# Patient Record
Sex: FEMALE | Race: WHITE
[De-identification: names, ages, dates, MRNs, and addresses within clinical notes are randomized per-mention and may not be internally consistent; named-entity substitution may affect disease eponyms.]

---

## 2024-09-12 ENCOUNTER — NON-APPOINTMENT (OUTPATIENT)
Age: 49
End: 2024-09-12

## 2024-09-13 ENCOUNTER — APPOINTMENT (OUTPATIENT)
Dept: NEUROSURGERY | Facility: CLINIC | Age: 49
End: 2024-09-13

## 2024-09-13 VITALS
HEIGHT: 66 IN | DIASTOLIC BLOOD PRESSURE: 80 MMHG | SYSTOLIC BLOOD PRESSURE: 125 MMHG | WEIGHT: 176 LBS | BODY MASS INDEX: 28.28 KG/M2

## 2024-09-13 DIAGNOSIS — Z86.59 PERSONAL HISTORY OF OTHER MENTAL AND BEHAVIORAL DISORDERS: ICD-10-CM

## 2024-09-13 DIAGNOSIS — G43.909 MIGRAINE, UNSPECIFIED, NOT INTRACTABLE, W/OUT STATUS MIGRAINOSUS: ICD-10-CM

## 2024-09-13 DIAGNOSIS — Z86.79 PERSONAL HISTORY OF OTHER DISEASES OF THE CIRCULATORY SYSTEM: ICD-10-CM

## 2024-09-13 DIAGNOSIS — I67.1 CEREBRAL ANEURYSM, NONRUPTURED: ICD-10-CM

## 2024-09-13 DIAGNOSIS — Z80.3 FAMILY HISTORY OF MALIGNANT NEOPLASM OF BREAST: ICD-10-CM

## 2024-09-13 PROBLEM — Z00.00 ENCOUNTER FOR PREVENTIVE HEALTH EXAMINATION: Status: ACTIVE | Noted: 2024-09-13

## 2024-09-13 PROCEDURE — 99204 OFFICE O/P NEW MOD 45 MIN: CPT

## 2024-09-13 RX ORDER — OMEPRAZOLE 40 MG/1
40 CAPSULE, DELAYED RELEASE ORAL
Refills: 0 | Status: ACTIVE | COMMUNITY

## 2024-09-13 RX ORDER — NORETHINDRONE ACETATE/ETHINYL ESTRADIOL 1MG-20MCG
KIT ORAL
Refills: 0 | Status: ACTIVE | COMMUNITY

## 2024-09-13 RX ORDER — METOPROLOL SUCCINATE 25 MG/1
25 TABLET, EXTENDED RELEASE ORAL
Refills: 0 | Status: ACTIVE | COMMUNITY

## 2024-09-13 RX ORDER — ALPRAZOLAM 0.25 MG/1
0.25 TABLET ORAL
Refills: 0 | Status: ACTIVE | COMMUNITY

## 2024-09-13 NOTE — HISTORY OF PRESENT ILLNESS
[de-identified] : 48-year-old right-handed female presents the neurosurgery office today to establish care, referred from ENT specialty.  Patient endorses that since July, worsening recently, she has been experiencing a whooshing sound to her left ear. It is sometimes associated with a whistling sound.  Both sounds are constant. She has a history of sinusitis and sinus issues and so presented to her primary care physician who placed her on a short course of steroids and antibiotics however the sounds persisted. She then went to an ENT specialist for evaluation and was referred for a CTA.  CTA performed on 8/21/2024 at [facility] incidentally identified irregularity and beading of the internal carotid and to a lesser degree the proximal right vertebral arteries and minimal involvement of the proximal left vertebral artery. 1 to 2 mm outpouchings, likely aneurysms, also noted to the distal right internal carotid artery.  No known family history of cerebral aneurysms or sudden death. No history of smoking cigarettes. Patient is on oral antihypertensive agents, BP today 125/80.  Patient was educated today that the aneurysm is a second concern. Positive left bruit auscultated behind her ear today during the visit. Evidence of sinus turbulence. The benefits of undergoing a diagnostic cerebral angiogram to rule out both a fistula and to gain more information for the cerebral aneurysms was discussed with Ms. VALDOVINOS today. She agreed to proceed and will be scheduled accordingly.

## 2024-09-13 NOTE — PHYSICAL EXAM
[General Appearance - Alert] : alert [General Appearance - In No Acute Distress] : in no acute distress [Oriented To Time, Place, And Person] : oriented to person, place, and time [Motor Tone] : muscle tone was normal in all four extremities [Abnormal Walk] : normal gait

## 2024-09-13 NOTE — ASSESSMENT
[FreeTextEntry1] : 48-year-old female presents as a new patient referred from ENT for an incidental finding of aneurysms along the distal right ICA during a workup for left-sided tinnitus.  Patient agreed to undergo a diagnostic cerebral angiogram to rule out both a fistula and to gain more information regarding the aneurysms.  She will be scheduled accordingly.  All questions were answered today.  Karen Ruby MS, FNP-BC Narayan El MD, [facility]

## 2024-09-14 ENCOUNTER — TRANSCRIPTION ENCOUNTER (OUTPATIENT)
Age: 49
End: 2024-09-14

## 2024-09-24 ENCOUNTER — OUTPATIENT (OUTPATIENT)
Dept: OUTPATIENT SERVICES | Facility: HOSPITAL | Age: 49
LOS: 1 days | Discharge: HOME | End: 2024-09-24
Payer: COMMERCIAL

## 2024-09-24 ENCOUNTER — TRANSCRIPTION ENCOUNTER (OUTPATIENT)
Age: 49
End: 2024-09-24

## 2024-09-24 ENCOUNTER — RESULT REVIEW (OUTPATIENT)
Age: 49
End: 2024-09-24

## 2024-09-24 VITALS
HEART RATE: 80 BPM | OXYGEN SATURATION: 99 % | SYSTOLIC BLOOD PRESSURE: 150 MMHG | RESPIRATION RATE: 18 BRPM | DIASTOLIC BLOOD PRESSURE: 66 MMHG

## 2024-09-24 VITALS
RESPIRATION RATE: 16 BRPM | SYSTOLIC BLOOD PRESSURE: 155 MMHG | HEIGHT: 60.6 IN | WEIGHT: 175.93 LBS | TEMPERATURE: 98 F | OXYGEN SATURATION: 98 % | DIASTOLIC BLOOD PRESSURE: 71 MMHG | HEART RATE: 80 BPM

## 2024-09-24 DIAGNOSIS — Z98.890 OTHER SPECIFIED POSTPROCEDURAL STATES: Chronic | ICD-10-CM

## 2024-09-24 DIAGNOSIS — I67.1 CEREBRAL ANEURYSM, NONRUPTURED: ICD-10-CM

## 2024-09-24 PROBLEM — Z87.898 PERSONAL HISTORY OF OTHER SPECIFIED CONDITIONS: Chronic | Status: ACTIVE | Noted: 2024-09-18

## 2024-09-24 PROBLEM — G43.909 MIGRAINE, UNSPECIFIED, NOT INTRACTABLE, WITHOUT STATUS MIGRAINOSUS: Chronic | Status: ACTIVE | Noted: 2024-09-18

## 2024-09-24 PROBLEM — F41.9 ANXIETY DISORDER, UNSPECIFIED: Chronic | Status: ACTIVE | Noted: 2024-09-18

## 2024-09-24 PROBLEM — I10 ESSENTIAL (PRIMARY) HYPERTENSION: Chronic | Status: ACTIVE | Noted: 2024-09-18

## 2024-09-24 PROBLEM — K21.9 GASTRO-ESOPHAGEAL REFLUX DISEASE WITHOUT ESOPHAGITIS: Chronic | Status: ACTIVE | Noted: 2024-09-18

## 2024-09-24 PROBLEM — H35.011 CHANGES IN RETINAL VASCULAR APPEARANCE, RIGHT EYE: Chronic | Status: ACTIVE | Noted: 2024-09-18

## 2024-09-24 PROCEDURE — 36226 PLACE CATH VERTEBRAL ART: CPT | Mod: 50

## 2024-09-24 PROCEDURE — 36227 PLACE CATH XTRNL CAROTID: CPT | Mod: 59,LT

## 2024-09-24 PROCEDURE — 76937 US GUIDE VASCULAR ACCESS: CPT

## 2024-09-24 PROCEDURE — 76377 3D RENDER W/INTRP POSTPROCES: CPT | Mod: 26

## 2024-09-24 PROCEDURE — C1887: CPT

## 2024-09-24 PROCEDURE — 36227 PLACE CATH XTRNL CAROTID: CPT | Mod: 50

## 2024-09-24 PROCEDURE — C1894: CPT

## 2024-09-24 PROCEDURE — 76937 US GUIDE VASCULAR ACCESS: CPT | Mod: 26

## 2024-09-24 PROCEDURE — 36224 PLACE CATH CAROTD ART: CPT | Mod: 50

## 2024-09-24 RX ORDER — SODIUM CHLORIDE 9 MG/ML
1000 INJECTION INTRAMUSCULAR; INTRAVENOUS; SUBCUTANEOUS
Refills: 0 | Status: DISCONTINUED | OUTPATIENT
Start: 2024-09-24 | End: 2024-09-24

## 2024-09-24 NOTE — ASU DISCHARGE PLAN (ADULT/PEDIATRIC) - CARE PROVIDER_API CALL
Narayan El  Neurosurgery  [address] [place] [address], [address]  [place] [address]  Phone: [phone]  Fax: [phone]  Follow Up Time:

## 2024-09-24 NOTE — BRIEF OPERATIVE NOTE - OPERATION/FINDINGS
Procedure: Diagnostic Cerebral Angiogram   Contrast: Visipaque 320   IA medications: Heparin 3000 IU, Verapamil 2.5mg, Nitroglycerin 200 mcg  Implants placed: None  Complications: None    Preliminary Report:  A selective diagnostic cerebral angiogram was performed. 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 It was explained to the patient that this is a possible source of the auditory changes experienced recently. Vascular changes consistent with a possible fibromuscular dysplasia were also identified. A right radial arteriotomy site was used for procedural access, and a TR band was applied post procedure.     Please continue to monitor the arteriotomy site for signs of hematoma/ bleeding/ infection or for diminished or absent distal pulses or temperature/ color changes. Notify a provider if any of the above is noticed or with any additional questions/ concerns.   NIHSS pre procedure: 0  NIHSS post procedure: 0  Extremity: RUE remains c/d/i with no sign of bleeding or hematoma formation; nontender; temperature and color consistent between b/l UE. Radial pulse intact to palpation.    Official IR neuro procedure note is to follow.

## 2024-09-24 NOTE — BRIEF OPERATIVE NOTE - COMMENTS
- Patient should follow up this Friday for discussion regarding conservative monitoring vs. endovascular management of the AVF (it was explained that the risks of hemorrhage at this time is likely low, but management may aid in the resolution of auditory symptoms)   - SBP <140

## 2024-09-24 NOTE — ASU PATIENT PROFILE, ADULT - FALL HARM RISK - UNIVERSAL INTERVENTIONS
Bed in lowest position, wheels locked, appropriate side rails in place/Call bell, personal items and telephone in reach/Instruct patient to call for assistance before getting out of bed or chair/Non-slip footwear when patient is out of bed/[place] to call system/Physically safe environment - no spills, clutter or unnecessary equipment/Purposeful Proactive Rounding/Room/bathroom lighting operational, light cord in reach

## 2024-09-24 NOTE — CHART NOTE - NSCHARTNOTEFT_GEN_A_CORE
PACU ANESTHESIA ADMISSION NOTE      Procedure: diagnostic cerebral angiogram  Post op diagnosis:  cerebral aneurysm    __x__  Patent Airway    __x__  Full return of protective reflexes    __x__  Full recovery from anesthesia / back to baseline status    Vitals:  T(C): 36.5 (09-24-24 @ 14:00), Max: 36.5 (09-24-24 @ 11:02)  HR: 77 (09-24-24 @ 14:00) (77 - 80)  BP: 141/67 (09-24-24 @ 14:00) (141/67 - 155/71)  RR: 18 (09-24-24 @ 14:00) (16 - 18)  SpO2: 100% (09-24-24 @ 14:00) (98% - 100%)    Mental Status:  __x__ Awake   ___x__ Alert   _____ Drowsy   _____ Sedated    Nausea/Vomiting:  __x__ NO  ______Yes,   See Post - Op Orders          Pain Scale (0-10):  __0___    Treatment: ____ None    __x__ See Post - Op/PCA Orders    Post - Operative Fluids:   ____ Oral   __x__ See Post - Op Orders    Plan: Discharge:   __x__Home       _____Floor     _____Critical Care    _____  Other:_________________    Comments: Patient had smooth intraoperative event, no anesthesia complication.  PACU Vital signs: HR: 80           BP:        141/67          RR: 16            O2 Sat:       98%

## 2024-09-24 NOTE — PRE PROCEDURE NOTE - PRE PROCEDURE EVALUATION
Interventional Neuro Radiology  Pre-Procedure Note PA-C    HPI:  The patient is a 48-year-old, right-handed female with a past history of HTN and no known family history of intracranial aneurysms who presented as a referral from an ENT clinic for a whooshing sound in her left ear, which she reports has been worsening since July of this year. Sometimes it is associated with a whistling sound, and the sounds have been constant. CTA h/n performed at [facility] 8/21/2024 showed irregularity and beading of the internal carotid artery and the proximal right vertebral arteries with minimal involvement of the proximal left vertebral artery. 1-2mm aneurysmal dilatation  of the distal right ICA were also reported. A possible left ear bruit was heard.  The patient presents today for a diagnostic cerebral angiogram to obtain better imaging of the intracranial vasculature given the above findings. NIHSS 0 with no acute complaints. NPO except medication after midnight last night.     Allergies: No Known Allergies    PAST MEDICAL & SURGICAL HISTORY:  Palpitation  Anxiety  GERD (gastroesophageal reflux disease)  HTN (hypertension)  Migraine headache  History of nasal congestion  Changes in retinal vascular appearance of right eye  S/P hernia surgery    FAMILY HISTORY:  FH: breast cancer (Mother)  FH: bladder cancer (Mother)  FH: skin cancer (Mother)  Family history of mitral valve repair (Sibling)    Assessment/Plan:   This is a 48-year-old female who presents for a diagnostic cerebral angiogram to assess vessel irregularities identified on outpatient CTA head/ neck.   Procedure, goals, risks, benefits and alternatives  were discussed with patient.  All questions were answered to best understanding.   Risks discussed include but are not limited to stroke, vessel injury, hemorrhage, and/or hematoma.  Patient demonstrates understanding of all risks involved with this procedure and wishes to continue.     Appropriate consent was obtained from patient and consent is in the patient's chart.

## 2024-09-27 ENCOUNTER — APPOINTMENT (OUTPATIENT)
Dept: NEUROSURGERY | Facility: CLINIC | Age: 49
End: 2024-09-27

## 2024-09-27 VITALS
WEIGHT: 176 LBS | DIASTOLIC BLOOD PRESSURE: 71 MMHG | SYSTOLIC BLOOD PRESSURE: 118 MMHG | HEIGHT: 66 IN | BODY MASS INDEX: 28.28 KG/M2

## 2024-09-27 DIAGNOSIS — I77.0 ARTERIOVENOUS FISTULA, ACQUIRED: ICD-10-CM

## 2024-09-27 PROBLEM — R00.2 PALPITATIONS: Chronic | Status: ACTIVE | Noted: 2024-09-18

## 2024-09-27 PROCEDURE — 99213 OFFICE O/P EST LOW 20 MIN: CPT

## 2024-09-27 NOTE — END OF VISIT
[FreeTextEntry3] :  I, Dr El personally performed the evaluation and management (E/M) services for this established patient who presents today with (a) new problem(s)/exacerbation of (an) existing condition(s).  That E/M includes conducting the examination, assessing all new/exacerbated conditions, and establishing a new plan of care.  Today, my JENNIFER was here to observe my evaluation and management services for this new problem/exacerbated condition to be followed going forward.  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  I do not see any obvious cortical venous reflux on the imaging.  These almost certainly explain her symptoms of pulsatile tinnitus and may well explain the congested feeling within her sinuses and nasal area on the ipsilateral side related to venous drainage through this occluded sinus.  I explained that her brain does not appear to be draining from the left-sided sigmoid sinus or left-sided region and that the options for treatment include observation, transvenous and/or transarterial embolization.  She also complains of palpitations and the feeling of a hyperdynamic type circulation which could be related to this fistula.  After considering all her options she wishes to proceed with endovascular management for the fistula and we will schedule this in due course.  We went over in detail the natural history, options as well as risks and benefits of therapy and I believe that she and her  were fully informed of these.

## 2024-10-01 ENCOUNTER — TRANSCRIPTION ENCOUNTER (OUTPATIENT)
Age: 49
End: 2024-10-01

## 2024-10-07 ENCOUNTER — RESULT REVIEW (OUTPATIENT)
Age: 49
End: 2024-10-07

## 2024-10-07 ENCOUNTER — OUTPATIENT (OUTPATIENT)
Dept: OUTPATIENT SERVICES | Facility: HOSPITAL | Age: 49
LOS: 1 days | End: 2024-10-07
Payer: COMMERCIAL

## 2024-10-07 DIAGNOSIS — R22.9 LOCALIZED SWELLING, MASS AND LUMP, UNSPECIFIED: ICD-10-CM

## 2024-10-07 DIAGNOSIS — Z00.8 ENCOUNTER FOR OTHER GENERAL EXAMINATION: ICD-10-CM

## 2024-10-07 DIAGNOSIS — Z98.890 OTHER SPECIFIED POSTPROCEDURAL STATES: Chronic | ICD-10-CM

## 2024-10-07 PROCEDURE — 93970 EXTREMITY STUDY: CPT

## 2024-10-07 PROCEDURE — 93970 EXTREMITY STUDY: CPT | Mod: 26

## 2024-10-08 ENCOUNTER — TRANSCRIPTION ENCOUNTER (OUTPATIENT)
Age: 49
End: 2024-10-08

## 2024-10-08 ENCOUNTER — NON-APPOINTMENT (OUTPATIENT)
Age: 49
End: 2024-10-08

## 2024-10-08 DIAGNOSIS — R22.9 LOCALIZED SWELLING, MASS AND LUMP, UNSPECIFIED: ICD-10-CM

## 2024-10-22 ENCOUNTER — OUTPATIENT (OUTPATIENT)
Dept: OUTPATIENT SERVICES | Facility: HOSPITAL | Age: 49
LOS: 1 days | End: 2024-10-22
Payer: COMMERCIAL

## 2024-10-22 VITALS
HEIGHT: 66 IN | RESPIRATION RATE: 16 BRPM | DIASTOLIC BLOOD PRESSURE: 86 MMHG | TEMPERATURE: 98 F | WEIGHT: 175.93 LBS | OXYGEN SATURATION: 96 % | SYSTOLIC BLOOD PRESSURE: 148 MMHG | HEART RATE: 81 BPM

## 2024-10-22 DIAGNOSIS — Z98.890 OTHER SPECIFIED POSTPROCEDURAL STATES: Chronic | ICD-10-CM

## 2024-10-22 DIAGNOSIS — I77.0 ARTERIOVENOUS FISTULA, ACQUIRED: ICD-10-CM

## 2024-10-22 DIAGNOSIS — Z01.818 ENCOUNTER FOR OTHER PREPROCEDURAL EXAMINATION: ICD-10-CM

## 2024-10-22 PROCEDURE — 99214 OFFICE O/P EST MOD 30 MIN: CPT | Mod: 25

## 2024-10-22 NOTE — H&P PST ADULT - HISTORY OF PRESENT ILLNESS
48 y/o female presents to [facility] in preparation for arteriovenous malformation and arteriovenous  fisula embolization in IR with Dr. El     Pt reports that she had a wishing sound in her left ear that prompted further workup and pt had a cerebral angiogram. Following results of cerebral angiogram pt now for above procedure.   As per surgeon note:  Conference: audible bruit around mastoid area can correlate to an arterial fistula, with jugular compression, the  pulsatile tinnitus did not improve as usual; symptoms are causing uncomfortable palpitations. There is fast venous  filling 2/2 AVF - majority filling from the MMA - could be a risk to embolize with the Wilman into the MMA. An option  could be to come transvenous and coil the fistula off and finish the treatment with [place] angiogram which disclosed what appears to be a [place] type I dural  arteriovenous fistula of the left sided sigmoid sinus. I do not see any obvious cortical venous reflux on the imaging.  These almost certainly explain her symptoms of pulsatile tinnitus and may well explain the congested feeling within her sinuses and nasal area on the ipsilateral side related to venous drainage through this occluded sinus. I explained that her brain does not appear to be draining from the left-sided sigmoid sinus or left-sided region and   that the options for treatment include observation, transvenous and/or transarterial embolization. She also complains of palpitations and the feeling of a hyperdynamic type circulation which could be related to this fistula.    PATIENT/GUARDIAN CURRENTLY DENIES CHEST PAIN  SHORTNESS OF BREATH  PALPITATIONS,  DYSURIA, OR UPPER RESPIRATORY INFECTION IN PAST 2 WEEKS  Patient/Guardian understands instructions and was given the opportunity to ask questions and have them answered.  As per patient/guardian, this is their complete medical and surgical history, including medications both prescribed or over the counter.  written and verbal instructions with teach back on chlorhexidine shampoo provided,  pt verbalized understanding with returned demonstration    Anesthesia Alert  NO--Difficult Airway  NO--History of neck surgery or radiation  NO--Limited ROM of neck  NO--History of Malignant hyperthermia  NO--Personal or family history of Pseudocholinesterase deficiency.  NO--Prior Anesthesia Complication  NO--Latex Allergy  NO--Loose teeth  NO--History of Rheumatoid Arthritis  NO--EDUARDO  NO--Bleeding risk  NO--Other_____  Mallampati airway: Class II    [facility] Activity Status Index (DASI)     RESULT SUMMARY:  58.2 points  The higher the score (maximum 58.2), the higher the functional status.    9.89 METs        INPUTS:  Take care of self —> 2.75 = Yes  Walk indoors —> 1.75 = Yes  Walk 1&ndash;2 blocks on level ground —> 2.75 = Yes  Climb a flight of stairs or walk up a hill —> 5.5 = Yes  Run a short distance —> 8 = Yes  Do light work around the house —> 2.7 = Yes  Do moderate work around the house —> 3.5 = Yes  Do heavy work around the house —> 8 = Yes  Do yardwork —> 4.5 = Yes  Have sexual relations —> 5.25 = Yes  Participate in moderate recreational activities —> 6 = Yes  Participate in strenuous sports —> 7.5 = Yes    Revised Cardiac Risk Index for Pre-Operative Risk    RESULT SUMMARY:  0 points  Class I Risk    3.9 %  30-day risk of death, MI, or cardiac arrest    INPUTS:  Elevated-risk surgery —> 0 = No  History of ischemic heart disease —> 0 = No  History of congestive heart failure —> 0 = No  History of cerebrovascular disease —> 0 = No  Pre-operative treatment with insulin —> 0 = No  Pre-operative creatinine >2 mg/dL / 176.8 µmol/L —> 0 = No      Acquired arteriovenous fistula    Encounter for other preprocedural examination    FH: breast cancer (Mother)    FH: bladder cancer (Mother)    FH: skin cancer (Mother)    Family history of mitral valve repair (Sibling)    Palpitation    Anxiety    GERD (gastroesophageal reflux disease)    HTN (hypertension)    Migraine headache    History of nasal congestion    Changes in retinal vascular appearance of right eye    S/P hernia surgery    [id number]    [id number]

## 2024-10-22 NOTE — H&P PST ADULT - REASON FOR ADMISSION
48 y/o female presents to [facility] in preparation for arteriovenous malformation and arteriovenous  fisula embolization in IR with Dr. El

## 2024-10-22 NOTE — H&P PST ADULT - NSICDXFAMILYHX_GEN_ALL_CORE_FT
FAMILY HISTORY:  Mother  Still living? Yes, Estimated age: Age Unknown  FH: bladder cancer, Age at diagnosis: Age Unknown  FH: breast cancer, Age at diagnosis: Age Unknown  FH: skin cancer, Age at diagnosis: Age Unknown    Sibling  Still living? Yes, Estimated age: Age Unknown  Family history of mitral valve repair, Age at diagnosis: Age Unknown

## 2024-10-22 NOTE — H&P PST ADULT - NSICDXPASTMEDICALHX_GEN_ALL_CORE_FT
PAST MEDICAL HISTORY:  Anxiety     Changes in retinal vascular appearance of right eye     GERD (gastroesophageal reflux disease)     History of nasal congestion     HTN (hypertension)     Migraine headache     Palpitation

## 2024-10-23 DIAGNOSIS — I77.0 ARTERIOVENOUS FISTULA, ACQUIRED: ICD-10-CM

## 2024-10-23 DIAGNOSIS — Z01.818 ENCOUNTER FOR OTHER PREPROCEDURAL EXAMINATION: ICD-10-CM

## 2024-10-24 ENCOUNTER — TRANSCRIPTION ENCOUNTER (OUTPATIENT)
Age: 49
End: 2024-10-24

## 2024-11-05 ENCOUNTER — INPATIENT (INPATIENT)
Facility: HOSPITAL | Age: 49
LOS: 1 days | Discharge: HOME | DRG: 27 | End: 2024-11-07
Attending: NEUROLOGICAL SURGERY | Admitting: NEUROLOGICAL SURGERY
Payer: COMMERCIAL

## 2024-11-05 ENCOUNTER — APPOINTMENT (OUTPATIENT)
Dept: NEUROSURGERY | Facility: HOSPITAL | Age: 49
End: 2024-11-05

## 2024-11-05 VITALS
DIASTOLIC BLOOD PRESSURE: 79 MMHG | WEIGHT: 175.93 LBS | HEART RATE: 89 BPM | RESPIRATION RATE: 18 BRPM | TEMPERATURE: 97 F | HEIGHT: 66 IN | SYSTOLIC BLOOD PRESSURE: 147 MMHG

## 2024-11-05 DIAGNOSIS — Z98.890 OTHER SPECIFIED POSTPROCEDURAL STATES: Chronic | ICD-10-CM

## 2024-11-05 DIAGNOSIS — I77.0 ARTERIOVENOUS FISTULA, ACQUIRED: ICD-10-CM

## 2024-11-05 PROCEDURE — 61624 TCAT PERM OCCLS/EMBOLJ CNS: CPT

## 2024-11-05 PROCEDURE — 36226 PLACE CATH VERTEBRAL ART: CPT | Mod: 50

## 2024-11-05 PROCEDURE — 97162 PT EVAL MOD COMPLEX 30 MIN: CPT | Mod: GP

## 2024-11-05 PROCEDURE — 76937 US GUIDE VASCULAR ACCESS: CPT | Mod: 26

## 2024-11-05 PROCEDURE — 75894 X-RAYS TRANSCATH THERAPY: CPT | Mod: 26

## 2024-11-05 PROCEDURE — 85025 COMPLETE CBC W/AUTO DIFF WBC: CPT

## 2024-11-05 PROCEDURE — 36415 COLL VENOUS BLD VENIPUNCTURE: CPT

## 2024-11-05 PROCEDURE — 99222 1ST HOSP IP/OBS MODERATE 55: CPT | Mod: GC

## 2024-11-05 PROCEDURE — 97166 OT EVAL MOD COMPLEX 45 MIN: CPT | Mod: GO

## 2024-11-05 PROCEDURE — 36223 PLACE CATH CAROTID/INOM ART: CPT | Mod: 50

## 2024-11-05 PROCEDURE — 83735 ASSAY OF MAGNESIUM: CPT

## 2024-11-05 PROCEDURE — 80053 COMPREHEN METABOLIC PANEL: CPT

## 2024-11-05 PROCEDURE — 36227 PLACE CATH XTRNL CAROTID: CPT | Mod: LT

## 2024-11-05 PROCEDURE — 84100 ASSAY OF PHOSPHORUS: CPT

## 2024-11-05 PROCEDURE — 75898 FOLLOW-UP ANGIOGRAPHY: CPT | Mod: 26

## 2024-11-05 RX ORDER — ALPRAZOLAM 0.25 MG
0.25 TABLET ORAL DAILY
Refills: 0 | Status: DISCONTINUED | OUTPATIENT
Start: 2024-11-05 | End: 2024-11-07

## 2024-11-05 RX ORDER — METOPROLOL TARTRATE 50 MG
25 TABLET ORAL DAILY
Refills: 0 | Status: DISCONTINUED | OUTPATIENT
Start: 2024-11-06 | End: 2024-11-06

## 2024-11-05 RX ORDER — SODIUM CHLORIDE 9 MG/ML
1000 INJECTION, SOLUTION INTRAMUSCULAR; INTRAVENOUS; SUBCUTANEOUS
Refills: 0 | Status: DISCONTINUED | OUTPATIENT
Start: 2024-11-05 | End: 2024-11-06

## 2024-11-05 RX ORDER — PANTOPRAZOLE SODIUM 40 MG/1
40 TABLET, DELAYED RELEASE ORAL
Refills: 0 | Status: DISCONTINUED | OUTPATIENT
Start: 2024-11-05 | End: 2024-11-07

## 2024-11-05 RX ORDER — ONDANSETRON HYDROCHLORIDE 2 MG/ML
4 INJECTION, SOLUTION INTRAMUSCULAR; INTRAVENOUS EVERY 6 HOURS
Refills: 0 | Status: DISCONTINUED | OUTPATIENT
Start: 2024-11-05 | End: 2024-11-07

## 2024-11-05 RX ORDER — ACETAMINOPHEN 500 MG
650 TABLET ORAL EVERY 6 HOURS
Refills: 0 | Status: DISCONTINUED | OUTPATIENT
Start: 2024-11-05 | End: 2024-11-05

## 2024-11-05 RX ORDER — ACETAMINOPHEN 500 MG
325 TABLET ORAL EVERY 4 HOURS
Refills: 0 | Status: DISCONTINUED | OUTPATIENT
Start: 2024-11-05 | End: 2024-11-06

## 2024-11-05 RX ADMIN — Medication 325 MILLIGRAM(S): at 20:49

## 2024-11-05 RX ADMIN — SODIUM CHLORIDE 75 MILLILITER(S): 9 INJECTION, SOLUTION INTRAMUSCULAR; INTRAVENOUS; SUBCUTANEOUS at 15:14

## 2024-11-05 RX ADMIN — Medication 650 MILLIGRAM(S): at 12:17

## 2024-11-05 RX ADMIN — Medication 325 MILLIGRAM(S): at 15:45

## 2024-11-05 NOTE — CONSULT NOTE ADULT - NS ATTEND AMEND GEN_ALL_CORE FT
50 yo f with hx htn anxiety, gerd s/p AVF embo, admitted to nsicu for monitoring    post-NI protocol neuro assessments  sbp 110-140  c/w home bblocker  analgesia prn  neuroIR followup    dispo nsicu

## 2024-11-05 NOTE — CONSULT NOTE ADULT - ASSESSMENT
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      Neurological:  - Neuro Checks q1  - Neurovascular check Q1 of the R. radial artery for hematoma  - Analgesia: Tylenol/tramadol 25mg prn  - NeuroIR following  - PT/OT  - Activity: bedrest    Cardiovascular:  - SBP goal: <140  - EKG - NSR  - Echo    Pulmonary:  - HOB 45  - Maintain SaO2 > 92%  - incentive spirometry    GI:  - NPO,   - Dysphagia screening  - GI ppx- not indicated at this time  - Bowel Regimen: Senna     :  - 1L NS @50 cc/hr  - Strict Is/Os  - Keep normonatremic  - Maintain Mg > 2, K >4, Phos >3  - Voiding freely    Endo:  - Keep euglycemia (Glu 140-180)    Hematology:  - SCDs    ID:  - Afebrile  - Monitor for fever      Code Status: Full   48 year old right handed female with a past history of HTN , anxiety present s/p elective AVF embolization.    Neurological:  - Neuro Checks q1  - Analgesia: Tylenol/tramadol 25mg prn  - C/w with home alprazolam 0.25 mg  - NeuroIR following  - PT/OT  - Activity: bedrest    Cardiovascular:  - SBP goal: 110-140  - EKG - NSR  - Echo    Pulmonary:  - HOB 45  - Maintain SaO2 > 92%  - incentive spirometry    GI:  - Diet  - GI ppx- not indicated at this time  - Bowel Regimen: Senna     :  - 1L NS @50 cc/hr  - Keep normonatremic  - Maintain Mg > 2, K >4, Phos >3  - Voiding freely    Endo:  - Keep euglycemia (Glu 140-180)    Hematology:  - SCDs    ID:  - Afebrile  - Monitor for fever      Code Status: Full   48 year old right handed female with a past history of HTN , anxiety present s/p elective AVF embolization.    Neurological:  - Neuro Checks q1  - Analgesia: Tylenol/tramadol 25mg prn  - C/w with home alprazolam 0.25 mg  - NeuroIR following  - PT/OT  - Activity: bedrest    Cardiovascular:  - SBP goal: 110-140  - C/w metoprolol 25mg    Pulmonary:  - HOB 45  - Maintain SaO2 > 92%  - Incentive spirometry    GI:  - Diet  - On home pantoprazole 40 mg daily  - Bowel Regimen: Senna     :  - 1L NS @75 cc/hr  - Keep normonatremic  - Maintain Mg > 2, K >4, Phos >3  - Voiding freely    Endo:  - Keep euglycemia (Glu 140-180)    Hematology:  - SCDs    ID:  - Afebrile  - Monitor for fever      Code Status: Full

## 2024-11-05 NOTE — H&P ADULT - HISTORY OF PRESENT ILLNESS
Patient is a 48 year old right handed female with a past history of HTN and no known family history of intracranial aneurysm who presented from ENT for complaints of whooshing sounds in her left ear, worsened since July. 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 Patient now presents today for elective AVF embolization with the neuroendovascular team. No complaints, NIHSS0.

## 2024-11-05 NOTE — BRIEF OPERATIVE NOTE - OPERATION/FINDINGS
Procedure : Diagnostic cerebral angiogram + AVF embolization     Contrast: Visipaque 320   IA medications: Heparin 3000 IU, Verapamil 2.5mg, Nitroglycerin 200 mcg   Implants placed: n/a  Complications : n/a    Preliminary Report:  Selective diagnostic angiogram performed from right radial arteriotomy site with preliminary findings of distal left MMA embolization for successful AVF embolization with Wilman and DMSO.     Official IR neuro procedure note to follow.

## 2024-11-05 NOTE — H&P ADULT - NSHPPHYSICALEXAM_GEN_ALL_CORE
T(C): 36.2 (11-05-24 @ 08:33), Max: 36.2 (11-05-24 @ 08:33)  HR: 89 (11-05-24 @ 08:33) (89 - 89)  BP: 147/79 (11-05-24 @ 08:33) (147/79 - 147/79)  RR: 18 (11-05-24 @ 08:33) (18 - 18)  SpO2: --    CONSTITUTIONAL: Well groomed, no apparent distress  EYES: PERRLA and symmetric, EOMI  RESP: No respiratory distress, no use of accessory muscles  CV: RRR  GI: Soft, NT, ND  NIHSS 0

## 2024-11-05 NOTE — H&P ADULT - ASSESSMENT
Patient is a 48 year old right handed female with a past history of HTN and no known family history of intracranial aneurysm who presented from ENT for complaints of whooshing sounds in her left ear, worsened since July. 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 Patient now presents today for elective AVF embolization with the neuroendovascular team. No complaints, NIHSS 0     Plan:  #Neuro:  - NIHSS q 15 min x 2hr q 30 min x 6 hr q 1 hr x 16 hr post procedure   - PT/OT  - Stat CTH if any worsening or deterioration in neurological examination and call NCC/Neurology    #CV:  - Keep 110-140   - Home metoprolol     #Resp:  - HOB > 35 degrees  - Aspiration precautions  - Keep SaO2 > 95%    #Renal/Fluid/Electrolytes:  - Keep euvolemic  - Keep normonatremic   - Keep Magnesium level > 2  - Monitor lytes, replete as needed    #GI:  - Dysphagia screening  - Continue Protonix    #Heme/Onc:  - SCS while in bed      #ID:  - Keep normothermic; avoid fevers  - Continue Tylenol 650mg PO q6hrs prn for temp > 38C    Code status: Full code  Disposition: ICU    Comanagement and admission coordinated with neuro ICU team.

## 2024-11-05 NOTE — CONSULT NOTE ADULT - SUBJECTIVE AND OBJECTIVE BOX
48 year old right handed female with a past history of HTN and no known family history of intracranial aneurysm who presented from ENT for complaints of whooshing sounds in her left ear, worsened since July. 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 Patient now presents today for elective AVF embolization with the neuroendovascular team. No complaints, NIHSS0.       ICU Vital Signs Last 24 Hrs  T(C): 36.2 (05 Nov 2024 12:50), Max: 36.2 (05 Nov 2024 08:33)  T(F): 97.2 (05 Nov 2024 12:50), Max: 97.2 (05 Nov 2024 08:33)  HR: 97 (05 Nov 2024 14:20) (86 - 99)  BP: 136/63 (05 Nov 2024 14:20) (121/56 - 147/79)  BP(mean): --  ABP: --  ABP(mean): --  RR: 18 (05 Nov 2024 14:20) (17 - 18)  SpO2: 96% (05 Nov 2024 14:20) (96% - 99%)          LABS:  Na:   K:   Cl:   CO2:   BUN:   Cr:   Glu:     Hgb:   Hct:   WBC:   Plt:     INR:   PTT:                   MEDICATIONS  (STANDING):  pantoprazole    Tablet 40 milliGRAM(s) Oral before breakfast  sodium chloride 0.9%. 1000 milliLiter(s) (75 mL/Hr) IV Continuous <Continuous>    MEDICATIONS  (PRN):  acetaminophen     Tablet .. 650 milliGRAM(s) Oral every 6 hours PRN Mild Pain (1 - 3)  ALPRAZolam 0.25 milliGRAM(s) Oral daily PRN for anxiety  ondansetron Injectable 4 milliGRAM(s) IV Push every 6 hours PRN Nausea and/or Vomiting   48 year old right handed female with a past history of HTN, anxiety and no known family history of intracranial aneurysm who presented from ENT for complaints of whooshing sounds in her left ear, worsened since July. 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 Patient now s/p elective AVF embolization. No complaints, NIHSS0.       ICU Vital Signs Last 24 Hrs  T(C): 36.2 (05 Nov 2024 12:50), Max: 36.2 (05 Nov 2024 08:33)  T(F): 97.2 (05 Nov 2024 12:50), Max: 97.2 (05 Nov 2024 08:33)  HR: 97 (05 Nov 2024 14:20) (86 - 99)  BP: 136/63 (05 Nov 2024 14:20) (121/56 - 147/79)  BP(mean): --  ABP: --  ABP(mean): --  RR: 18 (05 Nov 2024 14:20) (17 - 18)  SpO2: 96% (05 Nov 2024 14:20) (96% - 99%)          LABS:  Na:   K:   Cl:   CO2:   BUN:   Cr:   Glu:     Hgb:   Hct:   WBC:   Plt:     INR:   PTT:                   MEDICATIONS  (STANDING):  pantoprazole    Tablet 40 milliGRAM(s) Oral before breakfast  sodium chloride 0.9%. 1000 milliLiter(s) (75 mL/Hr) IV Continuous <Continuous>    MEDICATIONS  (PRN):  acetaminophen     Tablet .. 650 milliGRAM(s) Oral every 6 hours PRN Mild Pain (1 - 3)  ALPRAZolam 0.25 milliGRAM(s) Oral daily PRN for anxiety  ondansetron Injectable 4 milliGRAM(s) IV Push every 6 hours PRN Nausea and/or Vomiting      Physical Exam:  General: No acute distress  HEENT: Anicteric sclerae  Cardiac: [id number]  Lungs: Clear  Abdomen: Soft, non-tender, +BS  Extremities: No c/c/e  Skin/Incision Site: Clean, dry and intact  Neurologic: Awake, alert, oriented x3, follows commands, PERRL, no dysarthria, no gaze preference or dysconjugate, ROSENBERG, 5/5, sensation intact, no neglect

## 2024-11-05 NOTE — BRIEF OPERATIVE NOTE - COMMENTS
Please follow post NI orders for neuro checks, distal pulses, vitals, and arteriotomy site checks placed for total of 24 hour recovery period following procedure.   Keep HOB elevated, right wrist straight and immobile for x 4 hr    Keep IVF as ordered.   -140      Patient tolerated procedure well, hemodynamically stable, no change in neurological status compared to baseline.   NIHSS pre procedure __0 post procedure __0  Right wrist site CDI without evidence of bleeding hematoma oozing ecchymosis swelling at the time of closure. Site nontender to palpation. Temperature and color consistent bilaterally to palpation with intact distal pulses.     Please notify provider with any signs of bleeding or hematoma at arteriotomy site, change in mental status, vitals outside parameters, or absent distal pulses.   Management per neuro ICU. Signout given via spectra.   [id number]

## 2024-11-06 ENCOUNTER — TRANSCRIPTION ENCOUNTER (OUTPATIENT)
Age: 49
End: 2024-11-06

## 2024-11-06 LAB
ALBUMIN SERPL ELPH-MCNC: 3.7 G/DL — SIGNIFICANT CHANGE UP (ref 3.5–5.2)
ALP SERPL-CCNC: 49 U/L — SIGNIFICANT CHANGE UP (ref 30–115)
ALT FLD-CCNC: 12 U/L — SIGNIFICANT CHANGE UP (ref 0–41)
ANION GAP SERPL CALC-SCNC: 11 MMOL/L — SIGNIFICANT CHANGE UP (ref 7–14)
AST SERPL-CCNC: 18 U/L — SIGNIFICANT CHANGE UP (ref 0–41)
BASOPHILS # BLD AUTO: 0.02 K/UL — SIGNIFICANT CHANGE UP (ref 0–0.2)
BASOPHILS NFR BLD AUTO: 0.1 % — SIGNIFICANT CHANGE UP (ref 0–1)
BILIRUB SERPL-MCNC: 1 MG/DL — SIGNIFICANT CHANGE UP (ref 0.2–1.2)
BUN SERPL-MCNC: 8 MG/DL — LOW (ref 10–20)
CALCIUM SERPL-MCNC: 8.7 MG/DL — SIGNIFICANT CHANGE UP (ref 8.4–10.4)
CHLORIDE SERPL-SCNC: 106 MMOL/L — SIGNIFICANT CHANGE UP (ref 98–110)
CO2 SERPL-SCNC: 20 MMOL/L — SIGNIFICANT CHANGE UP (ref 17–32)
CREAT SERPL-MCNC: 0.7 MG/DL — SIGNIFICANT CHANGE UP (ref 0.7–1.5)
EGFR: 105 ML/MIN/1.73M2 — SIGNIFICANT CHANGE UP
EOSINOPHIL # BLD AUTO: 0 K/UL — SIGNIFICANT CHANGE UP (ref 0–0.7)
EOSINOPHIL NFR BLD AUTO: 0 % — SIGNIFICANT CHANGE UP (ref 0–8)
GLUCOSE SERPL-MCNC: 124 MG/DL — HIGH (ref 70–99)
HCT VFR BLD CALC: 36.8 % — LOW (ref 37–47)
HGB BLD-MCNC: 12.5 G/DL — SIGNIFICANT CHANGE UP (ref 12–16)
IMM GRANULOCYTES NFR BLD AUTO: 0.5 % — HIGH (ref 0.1–0.3)
LYMPHOCYTES # BLD AUTO: 1.49 K/UL — SIGNIFICANT CHANGE UP (ref 1.2–3.4)
LYMPHOCYTES # BLD AUTO: 10.1 % — LOW (ref 20.5–51.1)
MAGNESIUM SERPL-MCNC: 1.9 MG/DL — SIGNIFICANT CHANGE UP (ref 1.8–2.4)
MCHC RBC-ENTMCNC: 31.6 PG — HIGH (ref 27–31)
MCHC RBC-ENTMCNC: 34 G/DL — SIGNIFICANT CHANGE UP (ref 32–37)
MCV RBC AUTO: 92.9 FL — SIGNIFICANT CHANGE UP (ref 81–99)
MONOCYTES # BLD AUTO: 0.66 K/UL — HIGH (ref 0.1–0.6)
MONOCYTES NFR BLD AUTO: 4.5 % — SIGNIFICANT CHANGE UP (ref 1.7–9.3)
NEUTROPHILS # BLD AUTO: 12.49 K/UL — HIGH (ref 1.4–6.5)
NEUTROPHILS NFR BLD AUTO: 84.8 % — HIGH (ref 42.2–75.2)
NRBC # BLD: 0 /100 WBCS — SIGNIFICANT CHANGE UP (ref 0–0)
PHOSPHATE SERPL-MCNC: 2.9 MG/DL — SIGNIFICANT CHANGE UP (ref 2.1–4.9)
PLATELET # BLD AUTO: 316 K/UL — SIGNIFICANT CHANGE UP (ref 130–400)
PMV BLD: 9.2 FL — SIGNIFICANT CHANGE UP (ref 7.4–10.4)
POTASSIUM SERPL-MCNC: 3.7 MMOL/L — SIGNIFICANT CHANGE UP (ref 3.5–5)
POTASSIUM SERPL-SCNC: 3.7 MMOL/L — SIGNIFICANT CHANGE UP (ref 3.5–5)
PROT SERPL-MCNC: 6.4 G/DL — SIGNIFICANT CHANGE UP (ref 6–8)
RBC # BLD: 3.96 M/UL — LOW (ref 4.2–5.4)
RBC # FLD: 12.3 % — SIGNIFICANT CHANGE UP (ref 11.5–14.5)
SODIUM SERPL-SCNC: 137 MMOL/L — SIGNIFICANT CHANGE UP (ref 135–146)
WBC # BLD: 14.73 K/UL — HIGH (ref 4.8–10.8)
WBC # FLD AUTO: 14.73 K/UL — HIGH (ref 4.8–10.8)

## 2024-11-06 PROCEDURE — 99232 SBSQ HOSP IP/OBS MODERATE 35: CPT | Mod: GC

## 2024-11-06 RX ORDER — LABETALOL HCL 200 MG
10 TABLET ORAL ONCE
Refills: 0 | Status: DISCONTINUED | OUTPATIENT
Start: 2024-11-06 | End: 2024-11-07

## 2024-11-06 RX ORDER — HYDRALAZINE HYDROCHLORIDE 50 MG/1
5 TABLET, FILM COATED ORAL ONCE
Refills: 0 | Status: COMPLETED | OUTPATIENT
Start: 2024-11-06 | End: 2024-11-06

## 2024-11-06 RX ORDER — METOPROLOL TARTRATE 50 MG
12.5 TABLET ORAL ONCE
Refills: 0 | Status: COMPLETED | OUTPATIENT
Start: 2024-11-06 | End: 2024-11-06

## 2024-11-06 RX ORDER — METOPROLOL TARTRATE 50 MG
37.5 TABLET ORAL DAILY
Refills: 0 | Status: DISCONTINUED | OUTPATIENT
Start: 2024-11-07 | End: 2024-11-07

## 2024-11-06 RX ORDER — ACETAMINOPHEN 500 MG
975 TABLET ORAL EVERY 6 HOURS
Refills: 0 | Status: DISCONTINUED | OUTPATIENT
Start: 2024-11-06 | End: 2024-11-07

## 2024-11-06 RX ORDER — LABETALOL HCL 200 MG
10 TABLET ORAL
Refills: 0 | Status: DISCONTINUED | OUTPATIENT
Start: 2024-11-06 | End: 2024-11-07

## 2024-11-06 RX ADMIN — Medication 975 MILLIGRAM(S): at 01:01

## 2024-11-06 RX ADMIN — Medication 325 MILLIGRAM(S): at 21:21

## 2024-11-06 RX ADMIN — Medication 975 MILLIGRAM(S): at 16:27

## 2024-11-06 RX ADMIN — Medication 10 MILLIGRAM(S): at 03:14

## 2024-11-06 RX ADMIN — Medication 325 MILLIGRAM(S): at 19:30

## 2024-11-06 RX ADMIN — Medication 0.25 MILLIGRAM(S): at 13:25

## 2024-11-06 RX ADMIN — Medication 975 MILLIGRAM(S): at 00:00

## 2024-11-06 RX ADMIN — Medication 10 MILLIGRAM(S): at 18:21

## 2024-11-06 RX ADMIN — Medication 25 MILLIGRAM(S): at 05:34

## 2024-11-06 RX ADMIN — Medication 650 MILLIGRAM(S): at 21:21

## 2024-11-06 RX ADMIN — Medication 975 MILLIGRAM(S): at 23:18

## 2024-11-06 RX ADMIN — Medication 10 MILLIGRAM(S): at 21:20

## 2024-11-06 RX ADMIN — Medication 10 MILLIGRAM(S): at 09:09

## 2024-11-06 RX ADMIN — Medication 12.5 MILLIGRAM(S): at 17:40

## 2024-11-06 RX ADMIN — Medication 975 MILLIGRAM(S): at 19:31

## 2024-11-06 RX ADMIN — HYDRALAZINE HYDROCHLORIDE 5 MILLIGRAM(S): 50 TABLET, FILM COATED ORAL at 11:49

## 2024-11-06 NOTE — PHYSICAL THERAPY INITIAL EVALUATION ADULT - ADDITIONAL COMMENTS
Pt. lives with  in a private home with a few steps to enter and 1 FOS inside to access 2nd floor bedrooms and shower. Denies use of AD or DME PTA. Independent at baseline. Works full time as a guidance counselor. +Driving.

## 2024-11-06 NOTE — DISCHARGE NOTE PROVIDER - NSDCFUADDINST_GEN_ALL_CORE_FT
Patient was discharged post procedure without eventful recovery period.   Patient will follow with Dr. El in 1-2 weeks post procedure to discuss results and plan for treatment.     Discharge instructions were given to the patient in the presence of nursing staff.     Puncture site precautions: Ok to shower and remove dressing 24 hrs post procedure and leave it open to air (do not apply any lotion / ointments to the site). It is preferable to shower and avoid soaking the puncture site in bath / swimming pool / hot tub for 3 days following the procedure.   Groin puncture site - no driving for 36 hr post procedure cleared to resume driving thereafter, no lifting greater than 10 lbs for about 3 days post procedure cleared to resume after.   Wrist puncture site - cleared to drive, do not lift anything greater than 10lbs for 1 week in the arm that was accessed.     What to expect:   It is normal to experience some mild/mod discomfort at the puncture site that usually settles within 24-48 hr after the procedure as well as some superficial brising and skin discoloration. It is also common to experience a mild self resolvign headache during this time period that responds to tylenol / over the counter meds.     When to call the office / seek medical attention:   Bleeding - at the groin site, apply continuous manual pressure and seek medical attention   Swelling - or hard bump larger than the size of a nickel / small tangerine develops at the puncture site.   Discoloration / cold / numbness / tingling - in foot / leg or hand   Fever > 101.5F   Pain - especially if sudden or feeling of giving way at the puncture site   Symptoms of stroke - visual disturbances, face arm leg weakness, confusion, speech disturbance, drowsiness, persistent headache.     Office #[phone]

## 2024-11-06 NOTE — DISCHARGE NOTE PROVIDER - NSDCMRMEDTOKEN_GEN_ALL_CORE_FT
ALPRAZolam 0.25 mg oral tablet: 1 tab(s) orally once a day as needed for  anxiety  Excedrin oral tablet: 1 tab(s) orally once a day as needed for  headache  Flonase 50 mcg/inh nasal spray: 1 spray(s) in each nostril once a day as needed for Nasal congstion  metoprolol succinate 25 mg oral tablet, extended release: 1 tab(s) orally once a day  Microgestin 1.5/30 oral tablet: 1 tab(s) orally once a day (at bedtime)  omeprazole 40 mg oral delayed release capsule: 1 cap(s) orally once a day as needed for  indigestion

## 2024-11-06 NOTE — PHYSICAL THERAPY INITIAL EVALUATION ADULT - GENERAL OBSERVATIONS, REHAB EVAL
11:00-11:15 Pt. encountered in semifowler in bed in NAD.  at bedside. +Tele, +Pulse ox, +BP cuff. Agreeable to PT. SBP noted to be outside of set parameters during session (153 mmHg). Colette CHEW aware and agreeable for pt. to participate in therapy. Heron GUO aware.

## 2024-11-06 NOTE — DISCHARGE NOTE PROVIDER - NSDCFUSCHEDAPPT_GEN_ALL_CORE_FT
Narayan El Physician On license of [facility]  NEUROSURG [address] [place] [address]  Scheduled Appointment: 11/13/2024

## 2024-11-06 NOTE — PROGRESS NOTE ADULT - ASSESSMENT
48 year old right handed female with a past history of HTN , anxiety present s/p elective AVF embolization.    Neurological:  - Neuro Checks q4  - Analgesia: Tylenol/tramadol 25mg prn  - C/w with home alprazolam 0.25 mg  - NeuroIR following  - PT/OT  - Activity: bedrest    Cardiovascular:  - SBP goal: 100-140  troprol 25 xl at home; will determine if requires escalated dose    Pulmonary:  - HOB 45  - Maintain SaO2 > 92%  - Incentive spirometry  stable on RA     GI:  - Diet  - On home PPI  - Bowel Regimen: Senna     :  IVL  - Keep normonatremic  - Maintain Mg > 2, K >4, Phos >3  - Voiding freely    Endo:  - Keep euglycemia (Glu 140-180)    Hematology:  - SCDs  SqL    ID:  - Afebrile  - Monitor for fever      Code Status: Full      dispo- home vs neurovascular floor    48 year old right handed female with a past history of HTN , anxiety present s/p elective AVF embolization.    Neurological:  - Neuro Checks q4  - Analgesia: Tylenol/tramadol 25mg prn  - C/w with home alprazolam 0.25 mg  - NeuroIR following  - PT/OT  - Activity: bedrest    Cardiovascular:  - SBP goal: 100-150  pt with intermittent elevated SBP due to anxiety and pain, which was tx; even with tx, pt had mild increase in SBP >150  Toprol XL dose increased  from 25mg -->37.5 mg  qd    Pulmonary:  - HOB 45  - Maintain SaO2 > 92%  - Incentive spirometry  stable on RA     GI:  - Diet  - On home PPI  - Bowel Regimen: Senna     :  IVL  - Keep normonatremic  - Maintain Mg > 2, K >4, Phos >3  - Voiding freely    Endo:  - Keep euglycemia (Glu 140-180)    Hematology:  - SCDs  SqL    ID:  - Afebrile  - Monitor for fever      Code Status: Full      dispo-  neurovascular floor    48 year old right handed female with a past history of HTN , anxiety present s/p elective AVF embolization.    Neurological:  - Neuro Checks q4  - Analgesia: Tylenol/tramadol 25mg prn  - C/w with home alprazolam 0.25 mg  - NeuroIR following  - PT/OT  - Activity: bedrest    Cardiovascular:  - SBP goal: 100-150  pt with intermittent elevated SBP due to anxiety and pain, which was tx; even with tx, pt had mild increase in SBP >150  Toprol XL dose increased  from 25mg -->37.5 mg  qd    Pulmonary:  - HOB 45  - Maintain SaO2 > 92%  - Incentive spirometry  stable on RA     GI:  - Diet  - On home PPI  - Bowel Regimen: Senna     :  IVL  - Keep normonatremic  - Maintain Mg > 2, K >4, Phos >3  - Voiding freely    Endo:  - Keep euglycemia (Glu 140-180)    Hematology:  - SCDs  low risk for VTE as pt is ambulatory with low caprini score.  No chemoppx    ID:  - Afebrile  - Monitor for fever      Code Status: Full      dispo-  neurovascular floor

## 2024-11-06 NOTE — OCCUPATIONAL THERAPY INITIAL EVALUATION ADULT - REHAB POTENTIAL, OT EVAL
pt independent with ADLs and functional transfers. no skilled OT interventoin at this time needed. d/c OT

## 2024-11-06 NOTE — OCCUPATIONAL THERAPY INITIAL EVALUATION ADULT - NSACTIVITYREC_GEN_A_OT
Pt educated on RUE precautions 2* to access site for angiogram. Reviewed safety with functional transfers and ADL as well as symptoms to contact MD castellanos. Good understanding of all. Pt educated on showering post-d/c with mild temperature water for decrease risk of fall 2* to dilation of vessels with heat. Good understanding of all education/fall prevention strategies.

## 2024-11-06 NOTE — OCCUPATIONAL THERAPY INITIAL EVALUATION ADULT - PERTINENT HX OF CURRENT PROBLEM, REHAB EVAL
48 year old right handed female with a past history of HTN , anxiety present s/p elective AVF embolization.

## 2024-11-06 NOTE — OCCUPATIONAL THERAPY INITIAL EVALUATION ADULT - GENERAL OBSERVATIONS, REHAB EVAL
pt received semi rae in bed in NAD, in PACU, +tele, +BP cuff, +pulse oxi, +IV lock, spouse at bedside, agreeable to eval, left seated edge of bed RN aware, pt -150, parameters to 140, contacted Colette neurocrit PA who cleared pt to continue as long as stable and not continuing to increase. Pt bp stable, session continued.

## 2024-11-06 NOTE — CHART NOTE - NSCHARTNOTEFT_GEN_A_CORE
NCCU Transfer Note    Transfer from: NCCU    Transfer to: (  ) Medicine    (  ) Telemetry    (  ) RCU  ( ) SDU                               (  ) Palliative    (  ) Stroke Unit    (  ) MICU    ( x ) 4C    Accepting Physician:    Signout given to:     HPI / CCU COURSE:    48 year old right handed female with a past history of HTN, anxiety and no known family history of intracranial aneurysm who presented from ENT for complaints of whooshing sounds in her left ear, worsened since July. 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 Patient now s/p elective AVF embolization. No complaints, NIHSS0.      Vital Signs Last 24 Hrs  T(C): 36.8 (06 Nov 2024 08:00), Max: 36.8 (05 Nov 2024 23:15)  T(F): 98.3 (06 Nov 2024 08:00), Max: 98.3 (06 Nov 2024 08:00)  HR: 94 (06 Nov 2024 17:15) (81 - 111)  BP: 143/75 (06 Nov 2024 17:15) (117/59 - 173/77)  BP(mean): 103 (06 Nov 2024 17:15) (93 - 111)  RR: 20 (06 Nov 2024 17:15) (18 - 30)  SpO2: 98% (06 Nov 2024 17:15) (93% - 99%)    Parameters below as of 06 Nov 2024 17:15  Patient On (Oxygen Delivery Method): room air        I&O's Summary      Physical Exam:       LABS:                               12.5   14.73 )-----------( 316      ( 06 Nov 2024 04:20 )             36.8       11-06    137  |  106  |  8[L]  ----------------------------<  124[H]  3.7   |  20  |  0.7    Ca    8.7      06 Nov 2024 04:20  Phos  2.9     11-06  Mg     1.9     11-06    TPro  6.4  /  Alb  3.7  /  TBili  1.0  /  DBili  x   /  AST  18  /  ALT  12  /  AlkPhos  49  11-06        ASSESSMENT & PLAN:   Neurological:  - Neuro Checks q4  - Analgesia: Tylenol/tramadol 25mg prn  - C/w with home alprazolam 0.25 mg  - NeuroIR following  - PT/OT  - Activity: bedrest    Cardiovascular:  - SBP goal: 100-140  - troprol 25 xl at home; increased to 37.5 mg  - Vital check Q4hrs    GI:  - Diet  - On home PPI  - Bowel Regimen: Senna     Hematology:  - SCDs  SqL        Code Status: Full          FOR FOLLOW UP:  [ ] Q4hr BP monitoring  [ ] OT/PT  [ ]     [id number] NCCU Transfer Note    Transfer from: NCCU    Transfer to: (  ) Medicine    (  ) Telemetry    (  ) RCU  ( ) SDU                               (  ) Palliative    (  ) Stroke Unit    (  ) MICU    ( x ) 4C    Accepting Physician: Dr. El    Signout given to: JEEVAN Goldstein    HPI / CCU COURSE:    48 year old right handed female with a past history of HTN, anxiety and no known family history of intracranial aneurysm who presented from ENT for complaints of whooshing sounds in her left ear, worsened since July. 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 Patient now s/p elective AVF embolization. No complaints, NIHSS0.      Vital Signs Last 24 Hrs  T(C): 36.8 (06 Nov 2024 08:00), Max: 36.8 (05 Nov 2024 23:15)  T(F): 98.3 (06 Nov 2024 08:00), Max: 98.3 (06 Nov 2024 08:00)  HR: 94 (06 Nov 2024 17:15) (81 - 111)  BP: 143/75 (06 Nov 2024 17:15) (117/59 - 173/77)  BP(mean): 103 (06 Nov 2024 17:15) (93 - 111)  RR: 20 (06 Nov 2024 17:15) (18 - 30)  SpO2: 98% (06 Nov 2024 17:15) (93% - 99%)    Parameters below as of 06 Nov 2024 17:15  Patient On (Oxygen Delivery Method): room air        I&O's Summary      Physical Exam:       LABS:                               12.5   14.73 )-----------( 316      ( 06 Nov 2024 04:20 )             36.8       11-06    137  |  106  |  8[L]  ----------------------------<  124[H]  3.7   |  20  |  0.7    Ca    8.7      06 Nov 2024 04:20  Phos  2.9     11-06  Mg     1.9     11-06    TPro  6.4  /  Alb  3.7  /  TBili  1.0  /  DBili  x   /  AST  18  /  ALT  12  /  AlkPhos  49  11-06        ASSESSMENT & PLAN:   Neurological:  - Neuro Checks q4  - Analgesia: Tylenol/tramadol 25mg prn  - C/w with home alprazolam 0.25 mg  - NeuroIR following  - PT/OT  - Activity: bedrest    Cardiovascular:  - SBP goal: 100-140  - troprol 25 xl at home; increased to 37.5 mg  - Vital check Q4hrs    GI:  - Diet  - On home PPI  - Bowel Regimen: Senna     Hematology:  - SCDs  SqL        Code Status: Full          FOR FOLLOW UP:  [ ] Q4hr BP monitoring  [ ] OT/PT  [ ]     [id number] NCCU Transfer Note    Transfer from: NCCU    Transfer to: (  ) Medicine    (  ) Telemetry    (  ) RCU  ( ) SDU                               (  ) Palliative    (  ) Stroke Unit    (  ) MICU    ( x ) 4C    Accepting Physician: Dr. El    Signout given to: JEEVAN Goldstein    HPI / CCU COURSE:    48 year old right handed female with a past history of HTN, anxiety and no known family history of intracranial aneurysm who presented from ENT for complaints of whooshing sounds in her left ear, worsened since July. 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 Patient now s/p elective AVF embolization. No complaints, NIHSS0.      Vital Signs Last 24 Hrs  T(C): 36.8 (06 Nov 2024 08:00), Max: 36.8 (05 Nov 2024 23:15)  T(F): 98.3 (06 Nov 2024 08:00), Max: 98.3 (06 Nov 2024 08:00)  HR: 94 (06 Nov 2024 17:15) (81 - 111)  BP: 143/75 (06 Nov 2024 17:15) (117/59 - 173/77)  BP(mean): 103 (06 Nov 2024 17:15) (93 - 111)  RR: 20 (06 Nov 2024 17:15) (18 - 30)  SpO2: 98% (06 Nov 2024 17:15) (93% - 99%)    Parameters below as of 06 Nov 2024 17:15  Patient On (Oxygen Delivery Method): room air        I&O's Summary      Physical Exam:       LABS:                               12.5   14.73 )-----------( 316      ( 06 Nov 2024 04:20 )             36.8       11-06    137  |  106  |  8[L]  ----------------------------<  124[H]  3.7   |  20  |  0.7    Ca    8.7      06 Nov 2024 04:20  Phos  2.9     11-06  Mg     1.9     11-06    TPro  6.4  /  Alb  3.7  /  TBili  1.0  /  DBili  x   /  AST  18  /  ALT  12  /  AlkPhos  49  11-06        ASSESSMENT & PLAN:   Neurological:  - Neuro Checks q4  - Analgesia: Tylenol prn  - C/w with home alprazolam 0.25 mg  - NeuroIR following  - PT/OT  - Activity: bedrest    Cardiovascular:  - SBP goal: 100-140  - troprol 25 xl at home; increased to 37.5 mg  - Vital check Q4hrs    GI:  - Diet  - On home PPI  - Bowel Regimen: Senna     Hematology:  - SCDs  SqL      FOR FOLLOW UP:  [ ] Q4hr BP monitoring  [ ] OT/PT  [ ]     [id number]

## 2024-11-06 NOTE — DISCHARGE NOTE PROVIDER - NSDCCPTREATMENT_GEN_ALL_CORE_FT
PRINCIPAL PROCEDURE  Procedure: Embolization, AV fistula or malformation, cerebral  Findings and Treatment:

## 2024-11-06 NOTE — CHART NOTE - NSCHARTNOTEFT_GEN_A_CORE
Patient is a 48 year old female, history of HTN, anxiety, no known family history, found with left sided AV fistula and vascular changes on diagnostic cerebral angiogram, now s/p embolization via left MMA embo.     POD 1 s/p diagnostic cerebral angiogram with left MMA embo for fistula embolization.     NIHSS0     BP parameters 100-140.   Patient overnight / today experiencing episodes of HTN into the 150-160s requiring labetalol / hydralazine PRN pushes. Home Troprol 25 xl administered this am, increased to 37.5mg QD starting today.   Plan to downgrade from neuro ICU level of care to 4C unit for Q4hr neuro and vital checks. Monitoring BP parameters while on new dose of troprol.   Cleared from PT/OT perspective for D/C once within parameters and cleared from neuro/medical perspective.     Plan to d/c home tomorrow if vitals remain stable and BP remains within parameters on new troprol dose.     Discussed with Dr Schwab and Dr El.   Patient to be downgraded under neurosurgery / Dr El. Neurosurgery aware for overnight coverage.   x2405 Patient is a 49 year old female, history of HTN, anxiety, no known family history, found with left sided AV fistula and vascular changes on diagnostic cerebral angiogram, now s/p embolization via left MMA embo.     POD 1 s/p diagnostic cerebral angiogram with left MMA embo for fistula embolization.     NIHSS0     BP parameters 100-140.   Patient overnight / today experiencing episodes of HTN into the 150-160s requiring labetalol / hydralazine PRN pushes. Home Troprol 25 xl administered this am, increased to 37.5mg QD starting today.   Plan to downgrade from neuro ICU level of care to 4C unit for Q4hr neuro and vital checks. Monitoring BP parameters while on new dose of troprol.   Cleared from PT/OT perspective for D/C once within parameters and cleared from neuro/medical perspective.     Plan to d/c home tomorrow if vitals remain stable and BP remains within parameters on new troprol dose.     Discussed with Dr Schwab and Dr El.   Patient to be downgraded under neurosurgery / Dr El. Neurosurgery aware for overnight coverage.   [id number]

## 2024-11-06 NOTE — DISCHARGE NOTE PROVIDER - NSCORESITESY/N_GEN_A_CORE_RD
----- Message from Dany Hernandez MD sent at 11/14/2020  2:31 PM CST -----  Please make sure that the patient is informed.     No

## 2024-11-06 NOTE — DISCHARGE NOTE PROVIDER - HOSPITAL COURSE
Patient is a 48 year old right handed female with a past history of HTN and no known family history of intracranial aneurysm who presented from ENT for complaints of whooshing sounds in her left ear, worsened since July. 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   Patient is now POD 1 s/p diagnostic cerebral angiogram and AVF embolization via left MMA embolization with saira and DMSO.   Patient w complaints of 1/10 posterior headache, relieved with tylenol.   BP overnight elevated.   Pending PT/OT evaluation prior to discharge home today.     Appt 11/13 with Dr El.     Exam:   General - NAD   Neuro - AAO3, follows commands, EOMi, visual fields intact, moving all extremities to antigravity, sensation intact, no neglect, no aphasia, no dysarthria, no dysmetria on finger to nose   Extremity -right radial arteriotomy site cdi without evidence of bleeding hematoma or infection. Pulses intact.   NIHSS0     Patient was discharged post procedure without eventful recovery period.   Patient will follow with Dr. El in 1-2 weeks post procedure to discuss results and plan for treatment.     Discharge instructions were given to the patient in the presence of nursing staff.     Puncture site precautions: Ok to shower and remove dressing 24 hrs post procedure and leave it open to air (do not apply any lotion / ointments to the site). It is preferable to shower and avoid soaking the puncture site in bath / swimming pool / hot tub for 3 days following the procedure.   Groin puncture site - no driving for 36 hr post procedure cleared to resume driving thereafter, no lifting greater than 10 lbs for about 3 days post procedure cleared to resume after.   Wrist puncture site - cleared to drive, do not lift anything greater than 10lbs for 1 week in the arm that was accessed.     What to expect:   It is normal to experience some mild/mod discomfort at the puncture site that usually settles within 24-48 hr after the procedure as well as some superficial brising and skin discoloration. It is also common to experience a mild self resolvign headache during this time period that responds to tylenol / over the counter meds.     When to call the office / seek medical attention:   Bleeding - at the groin site, apply continuous manual pressure and seek medical attention   Swelling - or hard bump larger than the size of a nickel / small tangerine develops at the puncture site.   Discoloration / cold / numbness / tingling - in foot / leg or hand   Fever > 101.5F   Pain - especially if sudden or feeling of giving way at the puncture site   Symptoms of stroke - visual disturbances, face arm leg weakness, confusion, speech disturbance, drowsiness, persistent headache.     Office #[phone] No

## 2024-11-06 NOTE — PHYSICAL THERAPY INITIAL EVALUATION ADULT - NSPTDISCHREC_GEN_A_CORE
D/c PT at this time. Pt. is independent with functional mobility. Please re-consult if any change in fxnl status./No skilled PT needs

## 2024-11-06 NOTE — DISCHARGE NOTE PROVIDER - CARE PROVIDER_API CALL
Narayan El  Neurosurgery  [address] [place] [address], [address]  [place] [address]  Phone: [phone]  Fax: [phone]  Follow Up Time:

## 2024-11-07 ENCOUNTER — TRANSCRIPTION ENCOUNTER (OUTPATIENT)
Age: 49
End: 2024-11-07

## 2024-11-07 VITALS
DIASTOLIC BLOOD PRESSURE: 84 MMHG | RESPIRATION RATE: 18 BRPM | OXYGEN SATURATION: 96 % | SYSTOLIC BLOOD PRESSURE: 137 MMHG | HEART RATE: 62 BPM

## 2024-11-07 RX ORDER — HEPARIN SODIUM 10000 [USP'U]/ML
5000 INJECTION INTRAVENOUS; SUBCUTANEOUS EVERY 8 HOURS
Refills: 0 | Status: DISCONTINUED | OUTPATIENT
Start: 2024-11-07 | End: 2024-11-07

## 2024-11-07 RX ORDER — INFLUENZ VIR VAC TV P-SURF2003 15MCG/.5ML
0.5 SYRINGE (ML) INTRAMUSCULAR ONCE
Refills: 0 | Status: DISCONTINUED | OUTPATIENT
Start: 2024-11-07 | End: 2024-11-07

## 2024-11-07 RX ORDER — HYDRALAZINE HYDROCHLORIDE 50 MG/1
5 TABLET, FILM COATED ORAL ONCE
Refills: 0 | Status: DISCONTINUED | OUTPATIENT
Start: 2024-11-07 | End: 2024-11-07

## 2024-11-07 RX ADMIN — Medication 37.5 MILLIGRAM(S): at 05:20

## 2024-11-07 RX ADMIN — HEPARIN SODIUM 5000 UNIT(S): 10000 INJECTION INTRAVENOUS; SUBCUTANEOUS at 14:20

## 2024-11-07 RX ADMIN — Medication 975 MILLIGRAM(S): at 05:51

## 2024-11-07 RX ADMIN — Medication 975 MILLIGRAM(S): at 11:22

## 2024-11-07 RX ADMIN — Medication 975 MILLIGRAM(S): at 05:21

## 2024-11-07 RX ADMIN — PANTOPRAZOLE SODIUM 40 MILLIGRAM(S): 40 TABLET, DELAYED RELEASE ORAL at 05:20

## 2024-11-07 NOTE — PROGRESS NOTE ADULT - SUBJECTIVE AND OBJECTIVE BOX
Neuroendovascular Progress Note:     HPI:  Patient is a 48 year old right handed female with a past history of HTN and no known family history of intracranial aneurysm who presented from ENT for complaints of whooshing sounds in her left ear, worsened since July. 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 Patient now presents today for elective AVF embolization with the neuroendovascular team. No complaints, NIHSS0.  (05 Nov 2024 09:52)    Patient is POD 1 s/p diagnostic cerebral angiogram and AVF embolization via DMSO and Wilman to the distal left MMA. Patient with complaints of mild neck soreness after procedure, 1/10 consistent with post-op, nonworsening, nonradiating. Right radial arteriotomy site CDI without evidence of bleeding hematoma or infection.        Past medical history:   Palpitation    Anxiety    GERD (gastroesophageal reflux disease)    HTN (hypertension)    Migraine headache    History of nasal congestion    Changes in retinal vascular appearance of right eye        Medications:   metoprolol succinate ER 25 milliGRAM(s) Oral daily  pantoprazole    Tablet 40 milliGRAM(s) Oral before breakfast  sodium chloride 0.9%. 1000 milliLiter(s) IV Continuous <Continuous>      Vitals:   T(F): 98.3 (11-06-24 @ 08:00), Max: 98.3 (11-06-24 @ 08:00)  HR: 91 (11-06-24 @ 09:15) (81 - 111)  BP: 141/65 (11-06-24 @ 09:15) (117/59 - 160/76)  RR: 22 (11-06-24 @ 09:15) (16 - 30)  SpO2: 96% (11-06-24 @ 09:15) (93% - 99%)    Labs:                         12.5   14.73 )-----------( 316      ( 06 Nov 2024 04:20 )             36.8     11-06    137  |  106  |  8[L]  ----------------------------<  124[H]  3.7   |  20  |  0.7    Ca    8.7      06 Nov 2024 04:20  Phos  2.9     11-06  Mg     1.9     11-06    TPro  6.4  /  Alb  3.7  /  TBili  1.0  /  DBili  x   /  AST  18  /  ALT  12  /  AlkPhos  49  11-06      LIVER FUNCTIONS - ( 06 Nov 2024 04:20 )  Alb: 3.7 g/dL / Pro: 6.4 g/dL / ALK PHOS: 49 U/L / ALT: 12 U/L / AST: 18 U/L / GGT: x             Exam:   General - NAD   Neuro- AAo3, follows commands, EOMi, visual fields intact, face symmetric, moving all extremities to antigravity, sensation intact, no neglect, no aphasia no dysarthria   Extremity - right radial arteriotomy site CDI without evidence of bleeding hematoma or infection. Pulses intact to palpation. Temperature and color consistent.     Radiology:   Imaging reviewed per neuroendovascular ACP/attending.       Assessment:   48 female, history of HTN, presenting with whooshing sounds in left ear, now s/p AV fistula embolization with DMSO and Wilman, improvement in pulsatile tinnitis. Right radial arteriotomy site CDI       Suggestions:  Patient can be d/c home today with clearance from neuro ICU and PT/OT teams.   Continue BP < 140   Continue home medications   Follow up with Dr El 11/13   Discussed with neuro ICU and Dr El     [id number] neuroendovascular 
Subjective: 49yFemale with a pmhx of Acquired arteriovenous fistula    FH: breast cancer (Mother)    FH: bladder cancer (Mother)    FH: skin cancer (Mother)    Family history of mitral valve repair (Sibling)    Handoff    MEWS Score    Palpitation    Anxiety    GERD (gastroesophageal reflux disease)    HTN (hypertension)    Migraine headache    History of nasal congestion    Changes in retinal vascular appearance of right eye    Embolization, AV fistula or malformation, cerebral    AVF (arteriovenous fistula)    Embolization of cerebral vessel    S/P hernia surgery    History of cerebral angiography    [id number] malformation and arteriovenous fistula embolization    SysAdmin_VstLnk      Patient is a 49 year-old female PPD#2 s/p b/l MMAE. Patient was seen and examined at bedside this AM on 4C. She is lying in bed, A&Ox3, and following all commands. She endorses posterior headache but otherwise denies visual changes, weakness, paresthesias, dizziness, nausea at this time.     Allergies    No Known Allergies    Intolerances        Vital Signs Last 24 Hrs  T(C): 36.7 (07 Nov 2024 07:48), Max: 36.9 (06 Nov 2024 16:00)  T(F): 98 (07 Nov 2024 07:48), Max: 98.5 (06 Nov 2024 16:00)  HR: 62 (07 Nov 2024 15:28) (62 - 97)  BP: 137/84 (07 Nov 2024 15:28) (128/59 - 158/85)  BP(mean): 109 (06 Nov 2024 19:00) (103 - 114)  RR: 18 (07 Nov 2024 15:28) (18 - 20)  SpO2: 96% (07 Nov 2024 15:28) (96% - 99%)      acetaminophen     Tablet .. 975 milliGRAM(s) Oral every 6 hours PRN  ALPRAZolam 0.25 milliGRAM(s) Oral daily PRN  heparin   Injectable 5000 Unit(s) SubCutaneous every 8 hours  hydrALAZINE 5 milliGRAM(s) Oral once  influenza   Vaccine 0.5 milliLiter(s) IntraMuscular once  labetalol Injectable 10 milliGRAM(s) IV Push every 2 hours PRN  labetalol Injectable 10 milliGRAM(s) IV Push once  metoprolol succinate ER 37.5 milliGRAM(s) Oral daily  ondansetron Injectable 4 milliGRAM(s) IV Push every 6 hours PRN  pantoprazole    Tablet 40 milliGRAM(s) Oral before breakfast        11-06-24 @ 07:01  -  11-07-24 @ 07:00  --------------------------------------------------------  IN: 120 mL / OUT: 600 mL / NET: -480 mL        REVIEW OF SYSTEMS    [X] A ten-point review of systems was otherwise negative except as noted.  [ ] Due to altered mental status/intubation, subjective information were not able to be obtained from the patient. History was obtained, to the extent possible, from review of the chart and collateral sources of information.      Physical Exam:  General: Lying in bed, following all commands  AAOX3. Verbal function intact  Tongue midline, facial motions symmetric  PERRL, EOMI  Pronator Drift: Negative  Finger to Nose intact  Motor: MAEx4, good bulk and tone  5/5 strength in b/l UE's and LE's  Sensation: intact to touch in all extremities  Wound: Incision clean, dry, and intact without drainage        CBC Full  -  ( 06 Nov 2024 04:20 )  WBC Count : 14.73 K/uL  RBC Count : 3.96 M/uL  Hemoglobin : 12.5 g/dL  Hematocrit : 36.8 %  Platelet Count - Automated : 316 K/uL  Mean Cell Volume : 92.9 fL  Mean Cell Hemoglobin : 31.6 pg  Mean Cell Hemoglobin Concentration : 34.0 g/dL  Auto Neutrophil # : 12.49 K/uL  Auto Lymphocyte # : 1.49 K/uL  Auto Monocyte # : 0.66 K/uL  Auto Eosinophil # : 0.00 K/uL  Auto Basophil # : 0.02 K/uL  Auto Neutrophil % : 84.8 %  Auto Lymphocyte % : 10.1 %  Auto Monocyte % : 4.5 %  Auto Eosinophil % : 0.0 %  Auto Basophil % : 0.1 %    11-06    137  |  106  |  8[L]  ----------------------------<  124[H]  3.7   |  20  |  0.7    Ca    8.7      06 Nov 2024 04:20  Phos  2.9     11-06  Mg     1.9     11-06    TPro  6.4  /  Alb  3.7  /  TBili  1.0  /  DBili  x   /  AST  18  /  ALT  12  /  AlkPhos  49  11-06            Imaging:    Assessment/Plan:   49 year-old female PPD#2 s/p b/l MMAE.  -PRN analgesia for HAs  -PT/OT/Rehab  -DVT ppx  -Encourage incentive spirometry  -Neuroendovascular reccs  -Discussed with Dr. El    
48 year old right handed female with a past history of HTN, anxiety and no known family history of intracranial aneurysm who presented from ENT for complaints of whooshing sounds in her left ear, worsened since July. 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 Patient now s/p elective AVF embolization. No complaints, NIHSS0.     o/n- received labetalol IV     Physical Exam:  General: No acute distress  HEENT: Anicteric sclerae  Cardiac: [id number]  Lungs: Clear  Abdomen: Soft, non-tender, +BS  Extremities: No c/c/e  Skin/Incision Site: Clean, dry and intact  Neurologic: Awake, alert, oriented x3, follows commands, PERRL, no dysarthria, no gaze preference or dysconjugate, ROSENBERG, 5/5, sensation intact, no neglect        ICU Vital Signs Last 24 Hrs  T(C): 36.8 (06 Nov 2024 08:00), Max: 36.8 (05 Nov 2024 23:15)  T(F): 98.3 (06 Nov 2024 08:00), Max: 98.3 (06 Nov 2024 08:00)  HR: 94 (06 Nov 2024 12:00) (81 - 111)  BP: 143/67 (06 Nov 2024 12:00) (117/59 - 160/76)  BP(mean): 97 (06 Nov 2024 12:00) (93 - 105)  ABP: --  ABP(mean): --  RR: 19 (06 Nov 2024 12:00) (16 - 30)  SpO2: 96% (06 Nov 2024 12:00) (93% - 99%)            acetaminophen     Tablet .. 975 milliGRAM(s) Oral every 6 hours PRN  ALPRAZolam 0.25 milliGRAM(s) Oral daily PRN  labetalol Injectable 10 milliGRAM(s) IV Push every 2 hours PRN  metoprolol succinate ER 25 milliGRAM(s) Oral daily  ondansetron Injectable 4 milliGRAM(s) IV Push every 6 hours PRN  pantoprazole    Tablet 40 milliGRAM(s) Oral before breakfast  sodium chloride 0.9%. 1000 milliLiter(s) (75 mL/Hr) IV Continuous <Continuous>      LABS:  Na: 137 (11-06 @ 04:20)  K: 3.7 (11-06 @ 04:20)  Cl: 106 (11-06 @ 04:20)  CO2: 20 (11-06 @ 04:20)  BUN: 8 (11-06 @ 04:20)  Cr: 0.7 (11-06 @ 04:20)  Glu: 124(11-06 @ 04:20)    Hgb: 12.5 (11-06 @ 04:20)  Hct: 36.8 (11-06 @ 04:20)  WBC: 14.73 (11-06 @ 04:20)  Plt: 316 (11-06 @ 04:20)    INR:   PTT:           LIVER FUNCTIONS - ( 06 Nov 2024 04:20 )  Alb: 3.7 g/dL / Pro: 6.4 g/dL / ALK PHOS: 49 U/L / ALT: 12 U/L / AST: 18 U/L / GGT: x

## 2024-11-07 NOTE — DISCHARGE NOTE NURSING/CASE MANAGEMENT/SOCIAL WORK - NSDCPEFALRISK_GEN_ALL_CORE
For information on Fall & Injury Prevention, visit: [email or web address] OR  [email or web address] OR  https://www.cdc.gov/steadi/patient.html

## 2024-11-07 NOTE — PATIENT PROFILE ADULT - FALL HARM RISK - HARM RISK INTERVENTIONS

## 2024-11-07 NOTE — DISCHARGE NOTE NURSING/CASE MANAGEMENT/SOCIAL WORK - PATIENT PORTAL LINK FT
You can access the FollowMyHealth Patient Portal offered by [facility] by registering at the following website: [email or web address]. By joining [email or web address]’s FollowMyHealth portal, you will also be able to view your health information using other applications (apps) compatible with our system.

## 2024-11-07 NOTE — PATIENT PROFILE ADULT - SAFE PLACE TO LIVE
[facility] Call Center    Phone Message    May a detailed message be left on voicemail: yes     Reason for Call: Other: patient calling wanting to know if she can get a referral for painful sciatic pain or some medication and what she should do please advise.     Action Taken: Message routed to:  [facility] ([facility]): pcc    Travel Screening: Not Applicable     Date of Service:                                                                      no

## 2024-11-07 NOTE — DISCHARGE NOTE NURSING/CASE MANAGEMENT/SOCIAL WORK - FINANCIAL ASSISTANCE
[facility] provides services at a reduced cost to those who are determined to be eligible through [facility]’s financial assistance program. Information regarding [facility]’s financial assistance program can be found by going to [email or web address] or by calling [phone].

## 2024-11-13 ENCOUNTER — APPOINTMENT (OUTPATIENT)
Dept: NEUROSURGERY | Facility: CLINIC | Age: 49
End: 2024-11-13

## 2024-11-13 DIAGNOSIS — I77.0 ARTERIOVENOUS FISTULA, ACQUIRED: ICD-10-CM

## 2024-11-13 PROCEDURE — 99212 OFFICE O/P EST SF 10 MIN: CPT

## 2024-11-15 ENCOUNTER — NON-APPOINTMENT (OUTPATIENT)
Age: 49
End: 2024-11-15

## 2024-11-18 DIAGNOSIS — H93.A2 PULSATILE TINNITUS, LEFT EAR: ICD-10-CM

## 2024-11-18 DIAGNOSIS — F41.9 ANXIETY DISORDER, UNSPECIFIED: ICD-10-CM

## 2024-11-18 DIAGNOSIS — G43.909 MIGRAINE, UNSPECIFIED, NOT INTRACTABLE, WITHOUT STATUS MIGRAINOSUS: ICD-10-CM

## 2024-11-18 DIAGNOSIS — K21.9 GASTRO-ESOPHAGEAL REFLUX DISEASE WITHOUT ESOPHAGITIS: ICD-10-CM

## 2024-11-18 DIAGNOSIS — I10 ESSENTIAL (PRIMARY) HYPERTENSION: ICD-10-CM

## 2024-11-18 DIAGNOSIS — Q28.2 ARTERIOVENOUS MALFORMATION OF CEREBRAL VESSELS: ICD-10-CM
